# Patient Record
Sex: FEMALE | Race: WHITE
[De-identification: names, ages, dates, MRNs, and addresses within clinical notes are randomized per-mention and may not be internally consistent; named-entity substitution may affect disease eponyms.]

---

## 2015-01-19 VITALS
DIASTOLIC BLOOD PRESSURE: 86 MMHG | SYSTOLIC BLOOD PRESSURE: 140 MMHG | DIASTOLIC BLOOD PRESSURE: 86 MMHG | DIASTOLIC BLOOD PRESSURE: 86 MMHG | SYSTOLIC BLOOD PRESSURE: 140 MMHG | SYSTOLIC BLOOD PRESSURE: 140 MMHG

## 2017-04-06 ENCOUNTER — HOSPITAL ENCOUNTER (OUTPATIENT)
Dept: HOSPITAL 63 - MAMMO | Age: 67
Discharge: HOME | End: 2017-04-06
Attending: FAMILY MEDICINE
Payer: MEDICARE

## 2017-04-06 DIAGNOSIS — Z12.31: Primary | ICD-10-CM

## 2017-04-06 DIAGNOSIS — N63: ICD-10-CM

## 2017-04-06 PROCEDURE — 77063 BREAST TOMOSYNTHESIS BI: CPT

## 2017-04-06 NOTE — RAD
DATE: 4/6/2017   



EXAM: MAMMO MONICA SCREENING BILATERAL



HISTORY: Routine screening   



COMPARISON: None available   



This study was interpreted with the benefit of Computerized Aided Detection

(CAD).





FINDINGS: 2-D and 3-D tomosynthesis imaging was performed in CC and MLO

projections. There are scattered fibroglandular densities in the breasts.  A

small 5 mm nodule is noted near the midline of the left breast best delineated

on the cc view and image 41 of the CC tomograms. Its margins are smooth. There

is an additional small smooth 4 mm nodule in the inferolateral aspect of the

left breast as best seen on CC tomogram image #21. The fibroglandular pattern

in both breasts is generally somewhat nodular in character. No other discrete

breast nodule is seen. Minimal benign type calcification is present. No

suspicious microcalcifications are delineated.





IMPRESSION: Two small smooth benign-appearing left breast nodules are noted as

described above. Sonographic evaluation is suggested.





BI-RADS CATEGORY: 0 INCOMPLETE: NEEDS ADDITIONAL IMAGING EVALUATION AND/OR

PRIOR MAMMOGRAMS FOR COMPARISON.



RECOMMENDED FOLLOW-UP: ADD ADDITIONAL IMAGING



PQRS compliance statement: Patient information was entered into a reminder

system with a target due date     for the next mammogram.



Mammography is a sensitive method for finding small breast cancers, but it

does not detect them all and is not a substitute for careful clinical

examination.  A negative mammogram does not negate a clinically suspicious

finding and should not result in delay in biopsying a clinically suspicious

abnormality.



"Our facility is accredited by the American [employer] Mammography

Program."

## 2017-04-12 ENCOUNTER — HOSPITAL ENCOUNTER (OUTPATIENT)
Dept: HOSPITAL 63 - US | Age: 67
Discharge: HOME | End: 2017-04-12
Attending: FAMILY MEDICINE
Payer: MEDICARE

## 2017-04-12 DIAGNOSIS — N63: Primary | ICD-10-CM

## 2017-04-12 PROCEDURE — 76641 ULTRASOUND BREAST COMPLETE: CPT

## 2017-04-12 NOTE — RAD
Left breast sonogram



Clinical indications: Further evaluation of nodules seen on screening

mammogram dated April 6, 2017 one at the 4:00 position and another one at the

6 clock position seen best on 3-D images.



Findings: High-resolution sonography of the lower outer quadrant left breast

from the 3:00 to 6 clock position was performed. At the 4:00 position, 2 cm

from the nipple, a small hypoechoic nodule is seen measuring 4 mm in size

which most likely represents a complex cyst. There is a another small similar

hypoechoic nodule at the 6:00 position 6 cm from nipple measuring 4 mm in

size. Similar nodules are seen throughout the left breast but these 2

correspond to the mammographic findings. 



IMPRESSION: Probable complex cysts of the left breast. 6 month follow-up 3D

mammography and left breast sonography is recommended.



BI-RADS Category 3, probable benign finding. Six-month follow-up is

recommended.





The patient information was entered into the data reminder system with a

target due date for the next imaging studies of October 12, 2017.

## 2019-11-22 ENCOUNTER — HOSPITAL ENCOUNTER (OUTPATIENT)
Dept: HOSPITAL 61 - KCIC MRI | Age: 69
Discharge: HOME | End: 2019-11-22
Attending: ORTHOPAEDIC SURGERY
Payer: MEDICARE

## 2019-11-22 DIAGNOSIS — N28.1: ICD-10-CM

## 2019-11-22 DIAGNOSIS — M48.061: ICD-10-CM

## 2019-11-22 DIAGNOSIS — M51.27: ICD-10-CM

## 2019-11-22 DIAGNOSIS — M46.87: ICD-10-CM

## 2019-11-22 DIAGNOSIS — M47.816: Primary | ICD-10-CM

## 2019-11-22 PROCEDURE — 72148 MRI LUMBAR SPINE W/O DYE: CPT

## 2019-11-22 NOTE — [facility]
EXAMINATION: Magnetic resonance imaging (MRI) of the lumbar spine without 

contrast 11/22/2019 11:45 AM

 

HISTORY: Bilateral leg numbness

 

TECHNIQUE: Multiplanar multi-weighted MRI of the lumbar spine was 

performed without intravenous contrast using the standard lumbar spine 

protocol.

 

Contrast information:

None administered.

 

COMPARISON: None available.

 

FINDINGS:

 

There is minimal anterolisthesis of L4 on L5. Modic type I endplate 

degenerative changes are identified asymmetric to the right at L2-L3. 

Vertebral bodies demonstrate normal signal intensity on all sequences.  

There are no compression fractures.  The conus medullaris terminates at 

the level of L1. The distal spinal cord signal intensity is normal.  There

is moderate disc height loss at L2-L3 and mild disc height loss at L3-L4 

and L4-L5. There is mild disc height loss at L5-S1. Disc desiccation is 

noted at all levels of the lumbar spine. Annular fissures are noted at 

L2-L3, L3-L4 and L4-L5. Bilateral renal parapelvic cysts are present. No 

hydronephrosis or suspicious renal mass. Suspicious retroperitoneal 

abnormality. Visualized portions of the sacrum appear intact. The aorta is

normal. 

 

L1-L2: There is disc bulge asymmetric to the left. There is no facet 

arthropathy. There is mild left neuroforaminal stenosis. There is no 

spinal canal stenosis.

 

L2-L3: There is a moderate circumferential disc bulge asymmetric to the 

right. There is mild facet arthropathy with ligamentum flavum infolding. 

There is moderate to severe right and moderate left neuroforaminal 

stenosis. There is moderate to severe spinal canal stenosis. Findings are 

exacerbated by epidural lipomatosis.

 

L3-L4: There is a moderate circumferential disc bulge. There is mild to 

moderate facet arthropathy. There is severe left and moderate right 

neuroforaminal stenosis. There is moderate spinal canal stenosis, 

exacerbated by epidural lipomatosis.

 

L4-L5: There is a moderate disc bulge with right foraminal disc 

protrusion. There is mild/moderate facet arthropathy. Moderate to severe, 

left greater than right, neuroforaminal stenosis. No significant spinal 

canal stenosis.

 

L5-S1: Mild disc bulge. There is moderate facet arthropathy. No 

neuroforaminal or spinal canal stenosis.

 

IMPRESSION:

 

Moderate degenerative changes of lumbar spine, as described in detail 

above.

 

Electronically signed by: Sisi Connors MD (11/22/2019 1:20 PM) 

[facility]

## 2020-02-11 ENCOUNTER — HOSPITAL ENCOUNTER (OUTPATIENT)
Dept: HOSPITAL 63 - MAMMO | Age: 70
Discharge: HOME | End: 2020-02-11
Attending: FAMILY MEDICINE
Payer: MEDICARE

## 2020-02-11 DIAGNOSIS — I99.8: ICD-10-CM
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
## 2020-02-11 DIAGNOSIS — G40.111: ICD-10-CM

## 2020-02-11 DIAGNOSIS — E78.01: ICD-10-CM

## 2020-02-11 DIAGNOSIS — N32.81: ICD-10-CM

## 2020-02-11 DIAGNOSIS — Z12.31: Primary | ICD-10-CM
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
## 2020-02-11 DIAGNOSIS — D68.8: ICD-10-CM

## 2020-02-11 DIAGNOSIS — S83.251A: ICD-10-CM

## 2020-02-11 LAB
ANION GAP SERPL CALC-SCNC: 9 MMOL/L (ref 6–14)
APTT PPP: YELLOW S
BACTERIA #/AREA URNS HPF: (no result) /HPF
BASOPHILS # BLD AUTO: 0.1 X10^3/UL (ref 0–0.2)
BASOPHILS NFR BLD: 1 % (ref 0–3)
BILIRUB UR QL STRIP: (no result)
CA-I SERPL ISE-MCNC: 16 MG/DL (ref 7–20)
CALCIUM SERPL-MCNC: 9.2 MG/DL (ref 8.5–10.1)
CHLORIDE SERPL-SCNC: 104 MMOL/L (ref 98–107)
CO2 SERPL-SCNC: 27 MMOL/L (ref 21–32)
CREAT SERPL-MCNC: 1 MG/DL (ref 0.6–1)
EOSINOPHIL NFR BLD: 0.1 X10^3/UL (ref 0–0.7)
EOSINOPHIL NFR BLD: 3 % (ref 0–3)
ERYTHROCYTE [DISTWIDTH] IN BLOOD BY AUTOMATED COUNT: 14.3 % (ref 11.5–14.5)
FIBRINOGEN PPP-MCNC: (no result) MG/DL
GFR SERPLBLD BASED ON 1.73 SQ M-ARVRAT: 55.3 ML/MIN
GLUCOSE SERPL-MCNC: 86 MG/DL (ref 70–99)
GLUCOSE UR STRIP-MCNC: (no result) MG/DL
HCT VFR BLD CALC: 43.5 % (ref 36–47)
HGB BLD-MCNC: 14.2 G/DL (ref 12–15.5)
HYALINE CASTS #/AREA URNS LPF: (no result) /HPF
LYMPHOCYTES # BLD: 1.4 X10^3/UL (ref 1–4.8)
LYMPHOCYTES NFR BLD AUTO: 29 % (ref 24–48)
MCH RBC QN AUTO: 31 PG (ref 25–35)
MCHC RBC AUTO-ENTMCNC: 33 G/DL (ref 31–37)
MCV RBC AUTO: 96 FL (ref 79–100)
MONO #: 0.4 X10^3/UL (ref 0–1.1)
MONOCYTES NFR BLD: 9 % (ref 0–9)
NEUT #: 2.7 X10^3UL (ref 1.8–7.7)
NEUTROPHILS NFR BLD AUTO: 58 % (ref 31–73)
NITRITE UR QL STRIP: (no result)
PHENY: 4.9 MCG/ML (ref 10–20)
PLATELET # BLD AUTO: 234 X10^3/UL (ref 140–400)
POTASSIUM SERPL-SCNC: 3.9 MMOL/L (ref 3.5–5.1)
RBC # BLD AUTO: 4.55 X10^6/UL (ref 3.5–5.4)
RBC #/AREA URNS HPF: (no result) /HPF (ref 0–2)
SODIUM SERPL-SCNC: 140 MMOL/L (ref 136–145)
SP GR UR STRIP: 1.02
SQUAMOUS #/AREA URNS LPF: (no result) /LPF
UROBILINOGEN UR-MCNC: 0.2 MG/DL
WBC # BLD AUTO: 4.6 X10^3/UL (ref 4–11)
WBC #/AREA URNS HPF: (no result) /HPF (ref 0–4)

## 2020-02-11 PROCEDURE — 80061 LIPID PANEL: CPT

## 2020-02-11 PROCEDURE — 36415 COLL VENOUS BLD VENIPUNCTURE: CPT

## 2020-02-11 PROCEDURE — 77067 SCR MAMMO BI INCL CAD: CPT

## 2020-02-11 PROCEDURE — 84443 ASSAY THYROID STIM HORMONE: CPT

## 2020-02-11 PROCEDURE — 81001 URINALYSIS AUTO W/SCOPE: CPT

## 2020-02-11 PROCEDURE — 80185 ASSAY OF PHENYTOIN TOTAL: CPT

## 2020-02-11 PROCEDURE — 80048 BASIC METABOLIC PNL TOTAL CA: CPT

## 2020-02-11 PROCEDURE — 85025 COMPLETE CBC W/AUTO DIFF WBC: CPT

## 2020-02-11 PROCEDURE — 77063 BREAST TOMOSYNTHESIS BI: CPT

## 2020-02-11 PROCEDURE — 84439 ASSAY OF FREE THYROXINE: CPT

## 2020-02-11 NOTE — RAD
DATE: February 11, 2020



EXAM: MAMMO MONICA SCREENING BILATERAL



HISTORY: Screening study.



COMPARISON: 2017.



This study was interpreted with the benefit of Computerized Aided Detection

(CAD).



2-D digital mammographic views of both breasts were performed in the CC and

MLO projections. 3-D digital tomosynthesis images of both breasts were

performed in the CC and MLO projections and reviewed on a computer

workstation.



FINDINGS:



Breast Density: SCATTERED The breast parenchyma shows scattered fibroglandular

densities. Breast parenchyma level B..  There are no dominant suspicious

masses, suspicious microcalcifications or evidence of architectural

distortion.



Bilateral breast nodularity is stable.







IMPRESSION: No mammographic indicators for malignancy.







BI-RADS CATEGORY: 2 BENIGN FINDING



RECOMMENDED FOLLOW-UP: 12M 12 MONTH FOLLOW-UP



PQRS compliance statement: Patient information was entered into a reminder

system with a target due date February 12, 2021 for the next mammogram.



Mammography is a sensitive method for finding small breast cancers, but it

does not detect them all and is not a substitute for careful clinical

examination.  A negative mammogram does not negate a clinically suspicious

finding and should not result in delay in biopsying a clinically suspicious

abnormality.



"Our facility is accredited by the American [employer] of [employer] Mammography

Program."



The patient's breast density may affect the ability of mammography to detect

breast cancer. There are 4 categories of breast density, A, B, C and D. Breast

density A means that most of the breast tissue is replaced with adipose tissue

and therefore is not dense. Breast density B means that the breast tissue is

mildly dense and scattered. Breast density C means that the breast tissue is

heterogeneously dense. Breast density D means that the breast tissue is very

dense. Breast densities especially C and D may decrease the sensitivity of

mammography to detect breast cancer. Therefore, the patient may benefit from

3-D breast mammography (3D breast tomography) as a part of their screening

mammogram. Insurance may or may not pay for this additional imaging. The

patient's breast density based on today's mammogram is category B.

## 2020-02-12 LAB
CHOLEST/HDLC SERPL: 2 {RATIO}
HDLC SERPL-MCNC: 76 MG/DL (ref 40–60)
LDLC: 129 MG/DL (ref 0–100)
T4 FREE SERPL-MCNC: 1 NG/DL (ref 0.76–1.46)
THYROID STIM HORMONE (TSH): 4.54 UIU/ML (ref 0.36–3.74)
TRIGL SERPL-MCNC: 97 MG/DL (ref 0–150)
VLDLC: 19 MG/DL (ref 0–40)

## 2022-04-11 ENCOUNTER — HOSPITAL ENCOUNTER (OUTPATIENT)
Dept: HOSPITAL 63 - ER | Age: 72
Setting detail: OBSERVATION
LOS: 1 days | Discharge: HOME | End: 2022-04-12
Attending: FAMILY MEDICINE | Admitting: FAMILY MEDICINE
Payer: COMMERCIAL

## 2022-04-11 VITALS — DIASTOLIC BLOOD PRESSURE: 85 MMHG | SYSTOLIC BLOOD PRESSURE: 156 MMHG

## 2022-04-11 VITALS — HEIGHT: 66 IN | BODY MASS INDEX: 32.06 KG/M2 | WEIGHT: 199.52 LBS

## 2022-04-11 VITALS — DIASTOLIC BLOOD PRESSURE: 56 MMHG | SYSTOLIC BLOOD PRESSURE: 103 MMHG

## 2022-04-11 DIAGNOSIS — J40: ICD-10-CM

## 2022-04-11 DIAGNOSIS — F41.9: ICD-10-CM

## 2022-04-11 DIAGNOSIS — E78.00: ICD-10-CM

## 2022-04-11 DIAGNOSIS — Z20.822: ICD-10-CM

## 2022-04-11 DIAGNOSIS — G40.409: Primary | ICD-10-CM

## 2022-04-11 DIAGNOSIS — M10.9: ICD-10-CM

## 2022-04-11 DIAGNOSIS — M19.90: ICD-10-CM

## 2022-04-11 DIAGNOSIS — E03.9: ICD-10-CM

## 2022-04-11 DIAGNOSIS — Z98.890: ICD-10-CM

## 2022-04-11 DIAGNOSIS — E78.5: ICD-10-CM

## 2022-04-11 DIAGNOSIS — F05: ICD-10-CM

## 2022-04-11 DIAGNOSIS — Z90.49: ICD-10-CM

## 2022-04-11 DIAGNOSIS — H53.8: ICD-10-CM

## 2022-04-11 DIAGNOSIS — Z79.899: ICD-10-CM

## 2022-04-11 LAB
ALBUMIN SERPL-MCNC: 3.2 G/DL (ref 3.4–5)
ALBUMIN/GLOB SERPL: 0.8 {RATIO} (ref 1–1.7)
ALP SERPL-CCNC: 106 U/L (ref 46–116)
ALT SERPL-CCNC: 48 U/L (ref 14–59)
ANION GAP SERPL CALC-SCNC: 9 MMOL/L (ref 6–14)
AST SERPL-CCNC: 34 U/L (ref 15–37)
BASOPHILS # BLD AUTO: 0 X10^3/UL (ref 0–0.2)
BASOPHILS NFR BLD: 1 % (ref 0–3)
BILIRUB SERPL-MCNC: 0.3 MG/DL (ref 0.2–1)
BUN/CREAT SERPL: 11 (ref 6–20)
CA-I SERPL ISE-MCNC: 9 MG/DL (ref 7–20)
CALCIUM SERPL-MCNC: 9.1 MG/DL (ref 8.5–10.1)
CHLORIDE SERPL-SCNC: 105 MMOL/L (ref 98–107)
CO2 SERPL-SCNC: 27 MMOL/L (ref 21–32)
CREAT SERPL-MCNC: 0.8 MG/DL (ref 0.6–1)
EOSINOPHIL NFR BLD: 0 % (ref 0–3)
EOSINOPHIL NFR BLD: 0 X10^3/UL (ref 0–0.7)
ERYTHROCYTE [DISTWIDTH] IN BLOOD BY AUTOMATED COUNT: 14.6 % (ref 11.5–14.5)
GFR SERPLBLD BASED ON 1.73 SQ M-ARVRAT: 70.9 ML/MIN
GLOBULIN SER-MCNC: 4.1 G/DL (ref 2.2–3.8)
GLUCOSE SERPL-MCNC: 108 MG/DL (ref 70–99)
HCT VFR BLD CALC: 44.1 % (ref 36–47)
HGB BLD-MCNC: 14.3 G/DL (ref 12–15.5)
LYMPHOCYTES # BLD: 1.4 X10^3/UL (ref 1–4.8)
LYMPHOCYTES NFR BLD AUTO: 17 % (ref 24–48)
MAGNESIUM SERPL-MCNC: 2.2 MG/DL (ref 1.8–2.4)
MCH RBC QN AUTO: 31 PG (ref 25–35)
MCHC RBC AUTO-ENTMCNC: 33 G/DL (ref 31–37)
MCV RBC AUTO: 94 FL (ref 79–100)
MONO #: 0.5 X10^3/UL (ref 0–1.1)
MONOCYTES NFR BLD: 6 % (ref 0–9)
NEUT #: 6.2 X10^3UL (ref 1.8–7.7)
NEUTROPHILS NFR BLD AUTO: 76 % (ref 31–73)
PHENY: 10.5 MCG/ML (ref 10–20)
PLATELET # BLD AUTO: 287 X10^3/UL (ref 140–400)
POTASSIUM SERPL-SCNC: 4.4 MMOL/L (ref 3.5–5.1)
PROT SERPL-MCNC: 7.3 G/DL (ref 6.4–8.2)
RBC # BLD AUTO: 4.68 X10^6/UL (ref 3.5–5.4)
SODIUM SERPL-SCNC: 141 MMOL/L (ref 136–145)
WBC # BLD AUTO: 8.1 X10^3/UL (ref 4–11)

## 2022-04-11 PROCEDURE — 82553 CREATINE MB FRACTION: CPT

## 2022-04-11 PROCEDURE — 96376 TX/PRO/DX INJ SAME DRUG ADON: CPT

## 2022-04-11 PROCEDURE — 93005 ELECTROCARDIOGRAM TRACING: CPT

## 2022-04-11 PROCEDURE — G0379 DIRECT REFER HOSPITAL OBSERV: HCPCS

## 2022-04-11 PROCEDURE — 85025 COMPLETE CBC W/AUTO DIFF WBC: CPT

## 2022-04-11 PROCEDURE — 80185 ASSAY OF PHENYTOIN TOTAL: CPT

## 2022-04-11 PROCEDURE — 99285 EMERGENCY DEPT VISIT HI MDM: CPT

## 2022-04-11 PROCEDURE — U0003 INFECTIOUS AGENT DETECTION BY NUCLEIC ACID (DNA OR RNA); SEVERE ACUTE RESPIRATORY SYNDROME CORONAVIRUS 2 (SARS-COV-2) (CORONAVIRUS DISEASE [COVID-19]), AMPLIFIED PROBE TECHNIQUE, MAKING USE OF HIGH THROUGHPUT TECHNOLOGIES AS DESCRIBED BY CMS-2020-01-R: HCPCS

## 2022-04-11 PROCEDURE — 96361 HYDRATE IV INFUSION ADD-ON: CPT

## 2022-04-11 PROCEDURE — 70450 CT HEAD/BRAIN W/O DYE: CPT

## 2022-04-11 PROCEDURE — 83605 ASSAY OF LACTIC ACID: CPT

## 2022-04-11 PROCEDURE — G0378 HOSPITAL OBSERVATION PER HR: HCPCS

## 2022-04-11 PROCEDURE — 87426 SARSCOV CORONAVIRUS AG IA: CPT

## 2022-04-11 PROCEDURE — 96374 THER/PROPH/DIAG INJ IV PUSH: CPT

## 2022-04-11 PROCEDURE — 80053 COMPREHEN METABOLIC PANEL: CPT

## 2022-04-11 PROCEDURE — 36415 COLL VENOUS BLD VENIPUNCTURE: CPT

## 2022-04-11 PROCEDURE — 71046 X-RAY EXAM CHEST 2 VIEWS: CPT

## 2022-04-11 PROCEDURE — 83735 ASSAY OF MAGNESIUM: CPT

## 2022-04-11 RX ADMIN — LAMOTRIGINE SCH MG: 100 TABLET ORAL at 21:21

## 2022-04-11 NOTE — EKG
[facility] [address], [place] [address]

Test Date:    2022               Test Time:    16:08:05

Pat Name:     MILENA TEJADA      Department:   

Patient ID:   [id number]           Room:         109 A

Gender:       F                        Technician:   

:          1950               Requested By: DIXIE RICKETTS

Order Number: [id number]            Reading MD:   Erasmo Gates

                                 Measurements

Intervals                              Axis          

Rate:         108                      P:            31

SD:           164                      QRS:          39

QRSD:         104                      T:            39

QT:           358                                    

QTc:          484                                    

                           Interpretive Statements

SINUS TACHYCARDIA

ATRIAL PREMATURE COMPLEX(ES)

Electronically Signed On 4- 13:48:35 CDT by Erasmo Gates

## 2022-04-11 NOTE — RAD
CT HEAD/BRAIN WO



History: Acute on chronic seizure-like activity.



Comparison: CT head 1/19/2015



Technique: Noncontrast CT imaging was performed of the head.  



Findings: 

No intracranial hemorrhage. No mass effect. No hydrocephalus.  No evidence of acute territorial infar
ction. Atherosclerotic calcification of the intracranial carotid arteries. Mild prominence of the sul
ci and ventricles consistent with age-related volume loss.



Imaged orbits are unremarkable. Trace fluid in the bilateral maxillary sinuses. The scalp and calvari
um are unremarkable.



Impression:

1.  No acute intracranial abnormality. 





-----

Exposure: One or more of the following individualized dose reduction techniques were utilized for thi
s examination:  

1. Automated exposure control

2. Adjustment of the mA and/or kV according to patient size

3. Use of iterative reconstruction technique.



Electronically signed by: Brad West MD (4/11/2022 4:10 PM) [id number]

## 2022-04-11 NOTE — PHYS DOC
Past History


Past Medical History:  High Cholesterol, Hypothyroid, Seizure


Past Medical History


Limited secondary to postictal state


Past Surgical History:  No Surgical History


Past Surgical History


Limited secondary to postictal state


Smoking:  Non-smoker


Alcohol Use:  None


Drug Use:  None


Social History


Limited secondary to postictal state





General Adult


EDM:


Chief Complaint:  SEIZURE





HPI:


HPI:





72-year-old female with a history of focal seizures presents with a chief 

complaint of generalized seizure-like activity.  Her  is present in the 

room with her and reports that the first seizure began at 7:30 AM.  He reports 

that she has had 5 seizures between 7:30 AM and 2:30 PM.   states that 

the seizures she typically has involves twitching of her extremities however 

today there were full body tonic-clonic seizures which is not normal for her.  

 states she recently finished a antibiotic course of doxycycline and 

azithromycin with a steroid pack for bronchitis.  Patient reports they have also

recently adjusted her seizure medications which she takes.  Spouse reports 

compliance with these medications.  Denies recent trauma.  





History of present illness limited secondary to altered mental status/postictal 

state.





Review of Systems:


Review of Systems:


Constitutional: Denies fever or chills 


HENT: Denies epistaxis


Cardiovascular: Denies chest pain


GI: Denies abdominal pain, nausea, or vomiting


Integument: Denies laceration


Neurologic: Denies headache, Reports focal weakness, reports seizures





Review of systems limited secondary to postictal state/altered mental status





Current Medications:


Current Meds:





Current Medications








 Medications


  (Trade)  Dose


 Ordered  Sig/Rosa Elena  Start Time


 Stop Time Status Last Admin


Dose Admin


 


 Lorazepam


  (Ativan Inj)  2 mg  STK-MED ONCE  4/11/22 15:42


 4/11/22 15:42 DC  





 


 Sodium Chloride  1,000 ml @ 


 1,000 mls/hr  1X  ONCE  4/11/22 15:45


 4/11/22 16:44 UNV  














Allergies:


Allergies:





Allergies








Coded Allergies Type Severity Reaction Last Updated Verified


 


  naproxen Allergy Unknown  12/4/14 Yes











Physical Exam:


PE:


Constitutional: Well developed, obese, appears to be in postictal state, non-

toxic appearance


HENT: Normocephalic, atraumatic


Eyes: PERRL, EOMI, conjunctiva normal, no discharge, no nystagmus


Neck: Normal range of motion, no tenderness, supple


Lungs & Thorax:  No respiratory distress, equal chest rise and fall


Abdomen: Soft, no tenderness


Skin: Warm, dry, no erythema, no rash


Extremities: No tenderness, ROM intact, no edema


Neurologic: Alert and oriented X 2, extremity tremors, seems to be in a confused

state likely postictal


Psychologic: Flat normal, slow to respond





Current Patient Data:


Vital Signs:





                                   Vital Signs








  Date Time  Temp Pulse Resp B/P (MAP) Pulse Ox O2 Delivery O2 Flow Rate FiO2


 


4/11/22 15:10 98.5 84 18 143/83 (103) 95 Room Air  











EKG:


EKG:


EKG noted for significant baseline artifact





@16:08:05 Sinus Tachycardia 108bpm, NO obvious ST elevation,  ms, QT/QTc 

358/484ms





Radiology/Procedures:


Radiology/Procedures:


PROCEDURE: CT HEAD WO CONTRAST





CT HEAD/BRAIN WO





History: Acute on chronic seizure-like activity.





Comparison: CT head 1/19/2015





Technique: Noncontrast CT imaging was performed of the head.  





Findings: 


No intracranial hemorrhage. No mass effect. No hydrocephalus.  No evidence of 

acute territorial infarction. Atherosclerotic calcification of the intracranial 

carotid arteries. Mild prominence of the sulci and ventricles consistent with 

age-related volume loss.





Imaged orbits are unremarkable. Trace fluid in the bilateral maxillary sinuses. 

The scalp and calvarium are unremarkable.





Impression:


1.  No acute intracranial abnormality. 








-----


Exposure: One or more of the following individualized dose reduction techniques 

were utilized for this examination:  


1. Automated exposure control


2. Adjustment of the mA and/or kV according to patient size


3. Use of iterative reconstruction technique.





Electronically signed by: Brad West MD (4/11/2022 4:10 PM) [id number]





Heart Score:


C/O Chest Pain:  N/A





Course & Med Decision Making:


Course & Med Decision Making


Pertinent Labs and Imaging studies reviewed. (See chart for details)





Patient with history of what sounds like focal seizures for which she takes 

multiple seizure medications presents with generalized seizure-like activity.  

Patient does appear to be postictal state.  CT head without acute finding.  Labs

obtained and posted to chart.  IV fluid hydration given.





Given amount of seizures in a short period of time, decision to admit patient 

for further evaluation and treatment.  Attempted to call patient's neurologist 

Dr. Reyes.  Dr. Reyes requests addition bolus of 300mg of Dilantin.





Discussed with Dr. Benito (PCP) who is in agreement with admission. Will 

place order for Dr. Reyes (neurology) consultation.  Discussed findings and plan

with patient and family, who acknowledge understanding and agreement.





Dragon Disclaimer:


Dragon Disclaimer:


This electronic medical record was generated, in whole or in part, using a voice

recognition dictation system.





Departure


Departure:


Impression:  


   Primary Impression:  


   Breakthrough seizure


Disposition:  09 ADMITTED AS INPATIENT


Admitting Physician:  Neymar Benito


Condition:  STABLE


Referrals:  


NEYMAR BENITO MD (PCP)











DIXIE RICKETTS DO             Apr 11, 2022 16:24

## 2022-04-11 NOTE — NUR
The patient, MILENA TEJADA, 73 y/o, F admitted by NEYMAR BENITO MD, was given 
written information regarding hospital policies, unit procedures and contact persons.  



Valuables were checked and logged. Call light at reach.

## 2022-04-11 NOTE — NUR
Pt's  Diego accompanied pt to room. Unsure of home meds, requested RN call [facility] to get 
current list. [facility] contacted and med list updated. Meds reconciled per Dr. José.



Neuro consult called to Dr. Reyes, spoke directly. New order for repeat Dilantin level in AM 
prior to next scheduled dose.

## 2022-04-12 VITALS — DIASTOLIC BLOOD PRESSURE: 69 MMHG | SYSTOLIC BLOOD PRESSURE: 104 MMHG

## 2022-04-12 VITALS — DIASTOLIC BLOOD PRESSURE: 58 MMHG | SYSTOLIC BLOOD PRESSURE: 94 MMHG

## 2022-04-12 LAB — PHENY: 6.7 MCG/ML (ref 10–20)

## 2022-04-12 RX ADMIN — OXYBUTYNIN CHLORIDE SCH MG: 5 TABLET ORAL at 14:00

## 2022-04-12 RX ADMIN — LAMOTRIGINE SCH MG: 100 TABLET ORAL at 11:45

## 2022-04-12 RX ADMIN — PHENYTOIN SODIUM SCH MG: 100 CAPSULE ORAL at 09:00

## 2022-04-12 RX ADMIN — OXYBUTYNIN CHLORIDE SCH MG: 5 TABLET ORAL at 11:45

## 2022-04-12 RX ADMIN — PHENYTOIN SODIUM SCH MG: 100 CAPSULE ORAL at 14:00

## 2022-04-12 NOTE — HP
DATE OF SERVICE: 04/12/2022

ADMIT DATE: 04/11/2022

HISTORY OF PRESENT ILLNESS:  This is a 72-year-old female with history of 

seizures who has been complaining of generalized seizure-like activity.  The 

patient notes first seizure about 7:00 this morning, the day of admission.  The 

patient notes she has been involved with twitching of her extremities, full body

tonic-clonic seizure activity, eyes rolling back.  No biting of tongue.  No loss

of urine; however, the patient notes she has been on antibiotics and the patient

also notes that she has been taking her medications.  She has been adjusting 

some of her medications.  She sees Dr. Reyes, neurologist, for her seizure 

activity.  Denies any recent trauma.  The patient was seen in the Emergency 

Room, given some Ativan.  She was admitted for further evaluation and 

observation of her seizure activity.



PAST MEDICAL HISTORY:   Tonic-clonic seizures, epilepsy, tremors, tonsillectomy,

gait disturbances, frequent falls, and arthritis.



IMMUNIZATIONS:  Tetanus toxoid, influenza vaccination, pneumococcal up-to-date.



FAMILY HISTORY:  Positive for cancer.



ALLERGIES:  NAPROXEN.



SOCIAL HISTORY:  No smoking, alcohol or drug use.  FULL CODE.



REVIEW OF SYSTEMS:  Denies headaches, visual change, blurred vision, double 

vision.  Denies any melena, hematochezia, hematemesis and neurologically 

baseline except for the tremors and seizure-like activity.



MEDICATIONS:  Include that of pravastatin, Dilantin 100 mg 3 times a day, 

lamotrigine 200 mg b.i.d., levothyroxine 50 mcg daily, clobetasol propionate 50 

grain ointment p.r.n., Detrol-LA 4 mg daily, Myrbetriq 50 mg for overactive 

bladder.



PHYSICAL EXAMINATION:

GENERAL:  The patient on exam is a pleasant white female, presently in no 

apparent distress.  Has a mild cough.

VITAL SIGNS:  Blood pressure 156/85, respiratory rate 20, pulse 60, afebrile.

HEENT:  The patient's head was atraumatic, normocephalic.  Eyes:  PERRLA without

jaundice.  Mouth and throat show poor dentition.

NECK:  Supple without JVD, carotid bruits, or thyromegaly.

LUNGS:  Clear to auscultation.

CARDIOVASCULAR:  Regular sinus rhythm.

ABDOMEN:  The patient's abdomen is soft, nontender, no rebounding or guarding.  

Positive bowel sounds, no hepatosplenomegaly.

EXTREMITIES:  No clubbing, cyanosis, nor edema.

NEUROLOGIC:  The patient was alert and oriented x 3.  Speech fluent, 

spontaneous, appropriate.  Cranial nerves 2-12 are grossly intact.  The patient 

made no evidence of seizure activity at this time.



LABORATORY DATA:  Demonstrated white count 8, hemoglobin 14 and hematocrit 44.  

Sodium 141, _____ 4.4, 9, and 0.8, albumin 3.2.  Serology negative for COVID.  

The patient otherwise doing extremely well.  Otherwise, baseline, stable.  We 

will await chest x-ray.  Dr. Reyes is making good progress and will be 

monitoring her and make timely suggestions about control and get an EEG on her 

tomorrow.



IMPRESSION:  Tonic-clonic possible seizures, tremor and have her just on the 

medications.







OLGA/MAK/VIS

DR: OLGA/delonte   DD: 04/12/2022 09:52

DT: 04/12/2022 10:07   TID: [id number]

## 2022-04-12 NOTE — NUR
PATIENT WAS DISCHARGED TO HOME. PATIENTS DISCHARGE INSTRUCTIONS WERE REVIEWED WITH THE 
PATIENT WITH PATIENT VERBALIZING UNDERSTANDING. PATIENT LEFT THE UNIT VIA WHEELCHAIR 
ESCORTED BY KAYLYNN. PATIENT WAS TRANSPORTED HOME VIA PRIVATE VEHICLE.

## 2022-04-12 NOTE — RAD
XR CHEST 2V



History: Cough.



Comparison: None.



Technique: PA and lateral chest radiographs.



Findings:

The lungs are adequately and symmectrically inflated. No airspace consolidation, pleural effusion or 
pneumothorax. The cardiomediastinal silhoutte and pulmonary vasculature are within normal limits. Sof
t tissues and osseous structures are unremarkable.



Impression: 

1.  No acute cardiopulmonary process.



Electronically signed by: Brad West MD (4/12/2022 11:19 AM) [id number]

## 2022-04-13 NOTE — CONS
NEURO CONSULTATION



REFERRING PHYSICIAN:  Dr. José.



REASON FOR CONSULTATION:  Recurrent seizure-like activities.



HISTORY OF PRESENT ILLNESS:  This is a 72-year-old right-handed  female

who has had intermittent seizure-like activities a few days ago, described as 

generalized tonic-clonic seizure with loss of consciousness, followed by 

postictal confusion.  The patient has had impaired balance and frequent falls, 

but she did not have recent closed head injuries.  The patient has longstanding 

history of seizure of unknown etiology, probably due to previous head injuries. 

She denies headaches, visual disturbances, nausea, vomiting, chest pain, 

shortness of breath or palpitation.  However, she has been complaining of 

intermittent cough and recently diagnosed with bronchitis and placed on 

antibiotics, doxycycline.  The patient has been on Dilantin for several years 

and recently her Dilantin level was subtherapeutic, then by her primary care 

physician was given extra doses of Dilantin and 2 days later her Dilantin level 

was approximately 10.  She was seen in my office yesterday and was scheduled for

electroencephalogram to be done on Wednesday.  The patient stated her current 

Dilantin dose of 200 mg in the morning and 300 mg at night.  I received a call 

from the Emergency Room that the patient has had focal seizure and presented to 

Emergency Room with tremor of both upper extremities without complete loss of 

consciousness.  Her Dilantin level was 10.5.  It was recommended to give her 

another bolus of 300 mg intravenously.  The patient has not had any recurrent 

seizure since admission; however, her current Dilantin level is subtherapeutic 

at 6.7.  According to the patient, she did not have any loss of consciousness 

after her yesterday's seizures.  She described it as a tremor of the upper 

extremities and had no postictal confusion, tongue biting or urinary or bowel 

incontinence.  She complains of intermittent global headaches.  She denies 

nausea, vomiting, chest pain, shortness of breath, palpitation, dysarthria or 

dysphagia.



PAST MEDICAL HISTORY:  Significant for seizure disorder of unknown etiology, 

tremor of the upper extremities, hypothyroidism, hyperlipidemia, arthritis and 

impaired balance.



PAST SURGICAL HISTORY:  Positive for tonsillectomy.



FAMILY HISTORY:  Noncontributory.



SOCIAL HISTORY:  The patient is .  She has 2 children.  She denies 

smoking, alcohol drinking or illicit drug use.



CURRENT HOME MEDICATIONS:  Oxybutynin 5 mg t.i.d., clobetasol 1 ampule daily, 

phenytoin 100 mg t.i.d., mirabegron 50 mg daily, levothyroxine 50 mcg p.o. 

daily, Lipitor 10 mg at bedtime, lamotrigine 200 mg b.i.d., lorazepam 1 mg 

p.r.n. for anxiety and Zofran 4 mg q. 4 hours p.r.n. for vomiting.



ALLERGIES:  NAPROXEN.



REVIEW OF SYSTEMS:  A 10-point review of system was performed as mentioned above

in history of present illness.



PHYSICAL EXAMINATION:

GENERAL:  Well-developed, well-nourished female in no acute distress.  She 

weighs 90.5 kilos.

VITAL SIGNS:  Blood pressure 104/69, respiratory rate 18, pulse is 77, 

temperature 98.3, oxygen saturation is 95% on room air.

HEENT:  Normocephalic, atraumatic, otherwise unremarkable.

NECK:  Supple.  Negative for carotid bruit, lymphadenopathy or thyromegaly.

LUNGS:  Clear to A and P.

CARDIOVASCULAR:  Regular rate and rhythm, normal S1, S2.  There is no S3, S4 or 

murmur.

ABDOMEN:  Soft.  Bowel sounds positive.  No palpable mass, organomegaly or 

tenderness.

EXTREMITIES:  Negative for cyanosis, clubbing or pedal edema.

NEUROLOGIC:  Mental status:  The patient is alert and oriented x 3.  Speech is 

fluent.  There is no language dysfunction.  Memory, judgment and abstracting 

thinking are fair.  The patient denies hallucination or delusion.  Cranial 

nerves:  Visual fields are full.  The pupils are reactive to light and 

accommodation.  The extraocular movements are intact.  There is no nystagmus.  

There is no facial motor or sensory deficits.  Hearing is intact bilaterally.  

The palate is elevated symmetrically.  Sternocleidomastoid muscles are powerful 

bilaterally.  The patient shrugs her shoulders symmetrically, protrudes her 

tongue in the midline without fasciculation or atrophy.  Motor exam:  No focal 

muscle bulk wasting.  The tone is normal.  The strength is 5/5 throughout.  

Sensory examination revealed normal pinprick, light touch, vibratory and 

position senses.  Deep tendon reflexes were symmetric and active without 

pathologic responses.  Gait and coordination: The patient has abnormal tandem 

gait.  Romberg stance is positive.



DIAGNOSTIC DATA:  Initial nonenhanced head CT scan revealed no evidence of acute

intracranial process, but showed mild generalized atrophy.



LABORATORY DATA:  CBC revealed blood cells of 8.1 thousand, hemoglobin 14.9, 

hematocrit 44.1, platelet count 287,000.  Chemistry revealed sodium of 141, 

potassium 4.4, chloride 105, CO2 of 27, BUN 9, creatinine 0.4, glucose 108.  

Lactic acid is 1.9.  Liver enzymes are normal.  CPK is normal.  Dilantin level 

today is subtherapeutic at 6.7.  COVID rapid test is negative.



IMPRESSION:

1.  Longstanding history of seizure disorder, etiology uncertain, and possible 

breakthrough seizure described as generalized tonic-clonic seizure.  The patient

was admitted yesterday with recurrent episodes of tremor.  Her current Dilantin 

level is subtherapeutic despite was loaded with extra dose of Dilantin in 

Emergency Room.

2.  History of hyperlipidemia and hypothyroidism.



RECOMMENDATIONS:

1.  We will continue with Dilantin and start patient on Keppra at 500 mg b.i.d.

2.  The patient has been scheduled for electroencephalogram on Wednesday, 

04/13/2022.







ANRDE/TAYLER

DR: ARMANI/delonte   DD: 04/12/2022 09:06

DT: 04/13/2022 01:54   TID: [id number]